# Patient Record
Sex: FEMALE | Race: BLACK OR AFRICAN AMERICAN | NOT HISPANIC OR LATINO | URBAN - METROPOLITAN AREA
[De-identification: names, ages, dates, MRNs, and addresses within clinical notes are randomized per-mention and may not be internally consistent; named-entity substitution may affect disease eponyms.]

---

## 2024-05-22 ENCOUNTER — OFFICE VISIT (OUTPATIENT)
Dept: PRIMARY CARE CLINIC | Facility: CLINIC | Age: 24
End: 2024-05-22
Payer: MEDICAID

## 2024-05-22 VITALS
BODY MASS INDEX: 39.8 KG/M2 | OXYGEN SATURATION: 98 % | SYSTOLIC BLOOD PRESSURE: 122 MMHG | DIASTOLIC BLOOD PRESSURE: 78 MMHG | TEMPERATURE: 98 F | HEIGHT: 70 IN | HEART RATE: 91 BPM | WEIGHT: 278 LBS

## 2024-05-22 DIAGNOSIS — Z00.00 PREVENTATIVE HEALTH CARE: Primary | ICD-10-CM

## 2024-05-22 DIAGNOSIS — L83 ACANTHOSIS NIGRICANS: ICD-10-CM

## 2024-05-22 DIAGNOSIS — L91.8 SKIN TAG: ICD-10-CM

## 2024-05-22 DIAGNOSIS — E66.09 CLASS 2 OBESITY DUE TO EXCESS CALORIES WITH BODY MASS INDEX (BMI) OF 39.0 TO 39.9 IN ADULT, UNSPECIFIED WHETHER SERIOUS COMORBIDITY PRESENT: ICD-10-CM

## 2024-05-22 DIAGNOSIS — L20.9 ATOPIC DERMATITIS, UNSPECIFIED TYPE: ICD-10-CM

## 2024-05-22 PROCEDURE — 99999 PR PBB SHADOW E&M-NEW PATIENT-LVL IV: CPT | Mod: PBBFAC,,, | Performed by: STUDENT IN AN ORGANIZED HEALTH CARE EDUCATION/TRAINING PROGRAM

## 2024-05-22 PROCEDURE — 3008F BODY MASS INDEX DOCD: CPT | Mod: CPTII,,, | Performed by: STUDENT IN AN ORGANIZED HEALTH CARE EDUCATION/TRAINING PROGRAM

## 2024-05-22 PROCEDURE — 3074F SYST BP LT 130 MM HG: CPT | Mod: CPTII,,, | Performed by: STUDENT IN AN ORGANIZED HEALTH CARE EDUCATION/TRAINING PROGRAM

## 2024-05-22 PROCEDURE — 99385 PREV VISIT NEW AGE 18-39: CPT | Mod: S$PBB,,, | Performed by: STUDENT IN AN ORGANIZED HEALTH CARE EDUCATION/TRAINING PROGRAM

## 2024-05-22 PROCEDURE — 3078F DIAST BP <80 MM HG: CPT | Mod: CPTII,,, | Performed by: STUDENT IN AN ORGANIZED HEALTH CARE EDUCATION/TRAINING PROGRAM

## 2024-05-22 PROCEDURE — 99204 OFFICE O/P NEW MOD 45 MIN: CPT | Mod: PBBFAC,PN | Performed by: STUDENT IN AN ORGANIZED HEALTH CARE EDUCATION/TRAINING PROGRAM

## 2024-05-22 PROCEDURE — 1159F MED LIST DOCD IN RCRD: CPT | Mod: CPTII,,, | Performed by: STUDENT IN AN ORGANIZED HEALTH CARE EDUCATION/TRAINING PROGRAM

## 2024-05-22 RX ORDER — TRIAMCINOLONE ACETONIDE 1 MG/G
CREAM TOPICAL 2 TIMES DAILY
Qty: 45 G | Refills: 0 | Status: SHIPPED | OUTPATIENT
Start: 2024-05-22

## 2024-05-22 NOTE — PATIENT INSTRUCTIONS
Please call [facility] Medicaid Referral Team [phone] to follow up on your referrals. DERMATOLOGY

## 2024-05-22 NOTE — PROGRESS NOTES
Subjective     Patient ID: Nazanin Coughlin is a 24 y.o. female.    Chief Complaint: Annual Exam (Discoloration between breasts. Pt reports black color. /Wart removal on right hand./Skin tag in the left crease of groin area. Pt reports it feels irritated. Pink in color. Pt reports skin tag has been there for a couple months, in 2024. /Pt believes she have hyperpigmentation on her face and between thighs. /Pt has Family Hx of Diabetes. ) and Rash (Eczema flare up. //)    Pt is here to establish care. Pt has no medical conditions currently and is on no medications other than vitamins. Pt complains of skin darkening in between breasts and some recent flare ups of eczema. Currently using vaseline and finds some relief, but not resolved. Pt advised to trial OTC cortisone cream for flare ups.     Fhx: DM2 in mom, sister -prediabetic, no cancer in immediate family, grandmother had throat cancer, breast cancer in distant aunts, no CVA history  Shx: Lives at home with family, student at [employer], feels safe and supported at home, no issues with food availability or transportation. Never smoker. Drinks socially 1-2x a month. Does not use any recreational drugs.              Review of Systems   Constitutional:  Negative for chills, fever and unexpected weight change.   Respiratory:  Negative for shortness of breath.    Cardiovascular:  Negative for chest pain.   Gastrointestinal:  Negative for abdominal pain, blood in stool and change in bowel habit.   Endocrine: Negative for polydipsia.   Genitourinary:  Negative for dysuria, hematuria and menstrual irregularity.   Integumentary:  Positive for rash.        Eczema in skin creases in [place]ER[place] UE   Neurological:  Negative for syncope and light-headedness.   Psychiatric/Behavioral: Negative.            Objective     Physical Exam  Constitutional:       Appearance: She is obese.   HENT:      Nose: Nose normal.      Mouth/Throat:      Mouth: Mucous membranes are moist.   Eyes:       Extraocular Movements: Extraocular movements intact.      Conjunctiva/sclera: Conjunctivae normal.   Cardiovascular:      Rate and Rhythm: Normal rate and regular rhythm.      Pulses: Normal pulses.      Heart sounds: Normal heart sounds.   Pulmonary:      Effort: Pulmonary effort is normal.      Breath sounds: Normal breath sounds.   Abdominal:      General: Abdomen is flat.      Palpations: Abdomen is soft.   Musculoskeletal:         General: Normal range of motion.      Cervical back: Normal range of motion.   Skin:     General: Skin is warm and dry.      Comments: Acacnthosis nigricans in post neck crease  Eczema patches, dark, scaley   Neurological:      General: No focal deficit present.      Mental Status: She is alert. Mental status is at baseline.   Psychiatric:         Mood and Affect: Mood normal.         Behavior: Behavior normal.            Assessment and Plan     1. Preventative health care  -     CBC Auto Differential; Future; Expected date: 05/22/2024  -     HEPATITIS C ANTIBODY; Future; Expected date: 05/22/2024  -     HIV 1/2 Ag/Ab (4th Gen); Future; Expected date: 05/22/2024    2. Acanthosis nigricans  -     Hemoglobin A1C; Future; Expected date: 05/22/2024  -     Comprehensive Metabolic Panel; Future; Expected date: 05/22/2024  -     Lipid Panel; Future; Expected date: 05/22/2024    3. Skin tag  Comments:  L groin, tender to touch  Orders:  -     Ambulatory referral/consult to Dermatology; Future; Expected date: 05/29/2024    4. Atopic dermatitis, unspecified type  -     triamcinolone acetonide 0.1% (KENALOG) 0.1 % cream; Apply topically 2 (two) times daily.  Dispense: 45 g; Refill: 0  Discussed changing lifestyle including getting at least 30 min. of moderate intensity exercise 3-4 week. Limit processed/packaged foods. Increasing water intake and having a normal sleep schedule of at least 7 hours per night.    5. Class 2 obesity due to excess calories with body mass index (BMI) of 39.0 to 39.9  in adult, unspecified whether serious comorbidity present        Girish Concepcion MS4     I hereby acknowledge that I am relying upon documentation authored by a medical student working under my supervision and further I hereby attest that I have verified the student documentation or findings by personally performing the physical exam and medical decision making activities of the Evaluation and Management service to be billed.    Natasha Fierro MD      Follow up in about 1 year (around 5/22/2025).

## 2024-05-23 ENCOUNTER — TELEPHONE (OUTPATIENT)
Dept: PRIMARY CARE CLINIC | Facility: CLINIC | Age: 24
End: 2024-05-23
Payer: MEDICAID

## 2024-05-23 NOTE — TELEPHONE ENCOUNTER
----- Message from Natasha Fierro MD sent at 5/23/2024 10:05 AM CDT -----  Please let patient know that her blood work is normal, however her diabetes screening shows that her average blood sugar is a little higher than it should be at 1:14 a.m..  Prediabetes is diagnosed at an average blood sugar of 117, so although she does not have prediabetes she is very close.  We will rescreen her in 1 year.

## 2024-08-23 ENCOUNTER — TELEPHONE (OUTPATIENT)
Dept: PRIMARY CARE CLINIC | Facility: CLINIC | Age: 24
End: 2024-08-23
Payer: MEDICAID

## 2024-09-17 DIAGNOSIS — Z02.0 SCHOOL PHYSICAL EXAM: Primary | ICD-10-CM

## 2024-11-15 ENCOUNTER — OFFICE VISIT (OUTPATIENT)
Dept: PRIMARY CARE CLINIC | Facility: CLINIC | Age: 24
End: 2024-11-15
Payer: MEDICAID

## 2024-11-15 VITALS
OXYGEN SATURATION: 99 % | TEMPERATURE: 98 F | BODY MASS INDEX: 39.47 KG/M2 | DIASTOLIC BLOOD PRESSURE: 80 MMHG | WEIGHT: 275.69 LBS | SYSTOLIC BLOOD PRESSURE: 119 MMHG | HEIGHT: 70 IN | HEART RATE: 88 BPM

## 2024-11-15 DIAGNOSIS — Z12.4 CERVICAL CANCER SCREENING: Primary | ICD-10-CM

## 2024-11-15 DIAGNOSIS — Z30.09 FAMILY PLANNING: ICD-10-CM

## 2024-11-15 DIAGNOSIS — Z23 IMMUNIZATION DUE: ICD-10-CM

## 2024-11-15 PROCEDURE — 90480 ADMN SARSCOV2 VAC 1/ONLY CMP: CPT | Mod: PBBFAC,PN

## 2024-11-15 PROCEDURE — 88175 CYTOPATH C/V AUTO FLUID REDO: CPT | Performed by: STUDENT IN AN ORGANIZED HEALTH CARE EDUCATION/TRAINING PROGRAM

## 2024-11-15 PROCEDURE — 99214 OFFICE O/P EST MOD 30 MIN: CPT | Mod: PBBFAC,PN | Performed by: STUDENT IN AN ORGANIZED HEALTH CARE EDUCATION/TRAINING PROGRAM

## 2024-11-15 PROCEDURE — 99999 PR PBB SHADOW E&M-EST. PATIENT-LVL IV: CPT | Mod: PBBFAC,,, | Performed by: STUDENT IN AN ORGANIZED HEALTH CARE EDUCATION/TRAINING PROGRAM

## 2024-11-15 PROCEDURE — 91320 SARSCV2 VAC 30MCG TRS-SUC IM: CPT | Mod: PBBFAC,PN

## 2024-11-15 PROCEDURE — 99999PBSHW PR PBB SHADOW TECHNICAL ONLY FILED TO HB: Mod: PBBFAC,,,

## 2024-11-15 PROCEDURE — 90715 TDAP VACCINE 7 YRS/> IM: CPT | Mod: PBBFAC,PN

## 2024-11-15 PROCEDURE — 90471 IMMUNIZATION ADMIN: CPT | Mod: PBBFAC,PN

## 2024-11-15 PROCEDURE — 90656 IIV3 VACC NO PRSV 0.5 ML IM: CPT | Mod: PBBFAC,PN

## 2024-11-15 PROCEDURE — 87491 CHLMYD TRACH DNA AMP PROBE: CPT | Performed by: STUDENT IN AN ORGANIZED HEALTH CARE EDUCATION/TRAINING PROGRAM

## 2024-11-15 PROCEDURE — 0352U VAGINOSIS SCREEN BY DNA PROBE: CPT | Performed by: STUDENT IN AN ORGANIZED HEALTH CARE EDUCATION/TRAINING PROGRAM

## 2024-11-15 PROCEDURE — 90472 IMMUNIZATION ADMIN EACH ADD: CPT | Mod: PBBFAC,PN

## 2024-11-15 RX ADMIN — INFLUENZA VIRUS VACCINE 0.5 ML: 15; 15; 15 SUSPENSION INTRAMUSCULAR at 03:11

## 2024-11-15 RX ADMIN — TETANUS TOXOID, REDUCED DIPHTHERIA TOXOID AND ACELLULAR PERTUSSIS VACCINE, ADSORBED 0.5 ML: 5; 2.5; 8; 8; 2.5 SUSPENSION INTRAMUSCULAR at 03:11

## 2024-11-15 RX ADMIN — COVID-19 VACCINE, MRNA 0.3 ML: 0.04 INJECTION, SUSPENSION INTRAMUSCULAR at 03:11

## 2024-11-18 LAB
BACTERIAL VAGINOSIS DNA: NOT DETECTED
C TRACH DNA SPEC QL NAA+PROBE: DETECTED
CANDIDA GLABRATA/KRUSEI: NOT DETECTED
CANDIDA RRNA VAG QL PROBE: NOT DETECTED
N GONORRHOEA DNA SPEC QL NAA+PROBE: NOT DETECTED
TRICHOMONAS VAGINALIS: NOT DETECTED

## 2024-11-18 NOTE — PROGRESS NOTES
11/18/2024    Nazanin Coughlin  [id number]    Chief Complaint   Patient presents with    Follow-up     Pt is requesting labs for Lazer hair removal done by [facility] dermatology.          HPI    This pt is known to me presents for routine Pap smear.  This is patient's 1st Pap smear.      Negative 10 point ROS outside of HPI    Social History     Socioeconomic History    Marital status: Single   Tobacco Use    Smoking status: Never    Smokeless tobacco: Never           Current Outpatient Medications:     prenatal vit/iron fum/folic ac (PRENATAL 1+1 ORAL), Take by mouth. (Patient not taking: Reported on 11/15/2024), Disp: , Rfl:     triamcinolone acetonide 0.1% (KENALOG) 0.1 % cream, Apply topically 2 (two) times daily. (Patient not taking: Reported on 11/15/2024), Disp: 45 g, Rfl: 0      Physical Exam  Vitals:    11/15/24 1444   BP: 119/80   Pulse: 88   Temp: 97.9 °F (36.6 °C)       Physical Exam      Gen: well appearing, NAD  Resp: non labored breathing, no crackles, no wheezes, CTAB  CV: RRR no murmur, gallops, rubs, no LE edema  Abd: soft nontender BS present no organomegaly  Vulva: wnl  Vagina: wnl  Urethra: wnl  Cervix: wnl, no motion tenderness, friable    Problem List Items Addressed This Visit    None  Visit Diagnoses       Cervical cancer screening    -  Primary    Immunization due        Family planning                1. Cervical cancer screening  - Liquid-Based Pap Smear, Screening  - Vaginosis Screen by DNA Probe  - C. trachomatis/N. gonorrhoeae by AMP DNA    2. Immunization due  - Tdap (BOOSTRIX) vaccine injection 0.5 mL  - COVID-19 (Pfizer) 30 mcg/0.3 mL IM vaccine (>/= 13 yo) 0.3 mL  - influenza (Flulaval, Fluzone, Fluarix) 45 mcg/0.5 mL IM vaccine (> or = 6 mo) 0.5 mL    3. Family planning  - Ambulatory referral/consult to Gynecology; Future        RTC in one year or sooner as needed    Natasha Fierro MD  Family Medicine

## 2024-11-19 ENCOUNTER — TELEPHONE (OUTPATIENT)
Dept: PRIMARY CARE CLINIC | Facility: CLINIC | Age: 24
End: 2024-11-19
Payer: MEDICAID

## 2024-11-19 DIAGNOSIS — A74.9 CHLAMYDIA: Primary | ICD-10-CM

## 2024-11-19 RX ORDER — DOXYCYCLINE 100 MG/1
100 CAPSULE ORAL EVERY 12 HOURS
Qty: 14 CAPSULE | Refills: 0 | Status: SHIPPED | OUTPATIENT
Start: 2024-11-19 | End: 2024-11-26

## 2024-11-19 NOTE — TELEPHONE ENCOUNTER
----- Message from Natasha Fierro MD sent at 11/19/2024  8:10 AM CST -----  Please let pt know    You have tested positive for chlamydia, this is a curable sexually transmitted infection. I have sent doxycycline 100 mg to your pharmacy. You will take this twice daily for 7 days. It is important that you take all of the medication to ensure the infection is cleared.     I recommend notifying any partner, so that they may be treated appropriately. You can use an anonymous text service if you find it necessary. [employer] is a common one.

## 2024-11-19 NOTE — TELEPHONE ENCOUNTER
----- Message from Jayashree sent at 11/19/2024 10:53 AM CST -----  Contact: [phone]  Patient is returning a phone call.    Who left a message for the patient: Dr Fierro's office    Does patient know what this is regarding:  yes    Would you like a call back, or a response through your [facility] portal?:   phone    Comments:

## 2024-11-20 LAB
FINAL PATHOLOGIC DIAGNOSIS: NORMAL
Lab: NORMAL

## 2024-11-25 ENCOUNTER — PATIENT MESSAGE (OUTPATIENT)
Dept: PRIMARY CARE CLINIC | Facility: CLINIC | Age: 24
End: 2024-11-25
Payer: MEDICAID

## 2024-12-26 ENCOUNTER — TELEPHONE (OUTPATIENT)
Dept: PRIMARY CARE CLINIC | Facility: CLINIC | Age: 24
End: 2024-12-26
Payer: MEDICAID

## 2024-12-26 NOTE — TELEPHONE ENCOUNTER
----- Message from Natasha Fierro MD sent at 12/19/2024  2:37 PM CST -----  Please let pt know that her hep b titer was non reactive and she will need updated shot.

## 2025-03-18 ENCOUNTER — TELEPHONE (OUTPATIENT)
Dept: PRIMARY CARE CLINIC | Facility: CLINIC | Age: 25
End: 2025-03-18

## 2025-03-18 ENCOUNTER — CLINICAL SUPPORT (OUTPATIENT)
Dept: PRIMARY CARE CLINIC | Facility: CLINIC | Age: 25
End: 2025-03-18
Payer: MEDICAID

## 2025-03-18 DIAGNOSIS — Z11.1 VISIT FOR TB SKIN TEST: Primary | ICD-10-CM

## 2025-03-18 DIAGNOSIS — Z11.1 SCREENING-PULMONARY TB: Primary | ICD-10-CM

## 2025-03-18 PROCEDURE — 99999PBSHW PR PBB SHADOW TECHNICAL ONLY FILED TO HB: Mod: PBBFAC,,,

## 2025-03-18 PROCEDURE — 86580 TB INTRADERMAL TEST: CPT | Mod: PBBFAC,PN

## 2025-03-18 RX ADMIN — TUBERCULIN PURIFIED PROTEIN DERIVATIVE 5 UNITS: 5 INJECTION, SOLUTION INTRADERMAL at 04:03

## 2025-03-18 NOTE — PROGRESS NOTES
Time: 4:15   Date: 03/18/2025    Route: Lt forearm   VIS given   Lot#   NDC#  Expiration:                                                  Pt came in for PPD placement for school. PPD placed on left forearm. No c/o voiced.

## 2025-03-18 NOTE — TELEPHONE ENCOUNTER
----- Message from Jayashree sent at 3/18/2025  2:48 PM CDT -----  Contact: [phone]  Patient called, would like to get a tb shot. Please call and advise.